# Patient Record
Sex: FEMALE
[De-identification: names, ages, dates, MRNs, and addresses within clinical notes are randomized per-mention and may not be internally consistent; named-entity substitution may affect disease eponyms.]

---

## 2021-01-12 PROBLEM — Z00.00 ENCOUNTER FOR PREVENTIVE HEALTH EXAMINATION: Status: ACTIVE | Noted: 2021-01-12

## 2021-01-14 ENCOUNTER — APPOINTMENT (OUTPATIENT)
Dept: NEUROSURGERY | Facility: CLINIC | Age: 21
End: 2021-01-14
Payer: COMMERCIAL

## 2021-01-14 ENCOUNTER — TRANSCRIPTION ENCOUNTER (OUTPATIENT)
Age: 21
End: 2021-01-14

## 2021-01-14 DIAGNOSIS — Z78.9 OTHER SPECIFIED HEALTH STATUS: ICD-10-CM

## 2021-01-14 PROCEDURE — 99202 OFFICE O/P NEW SF 15 MIN: CPT

## 2021-01-14 PROCEDURE — 99072 ADDL SUPL MATRL&STAF TM PHE: CPT

## 2021-01-20 NOTE — ASSESSMENT
[FreeTextEntry1] : Impression:\par Pulsatile tinnitus, likely venous\par \par MRA without AVM or DAVF\par \par We discussed possible causes of pulsatile tinnitus. These include:\par ENT causes such as semicircular canal dehiscence.\par Cardiac causes such as aortic stenosis, valve disease, heart murmur.\par Hypertension.\par Anemia.\par Thyroid disease.\par Cerebrovascular problems: arteriovenous malformation (AVM), dural arteriovenous fistula (DAVF), Venous sinus stenosis, venous aneurysm, large trans-mastoid emissary vein, carotid stenosis.\par Idiopathic Intracranial Hypertension\par \par \par PLAN\par MRV Head w/wo\par Follow-up after the MRV

## 2021-01-20 NOTE — REVIEW OF SYSTEMS
[As Noted in HPI] : as noted in HPI [Negative] : Heme/Lymph [de-identified] : Stress Related Headaches [FreeTextEntry4] : Pulsatile Tinnitus

## 2021-01-20 NOTE — PHYSICAL EXAM
[General Appearance - Alert] : alert [General Appearance - In No Acute Distress] : in no acute distress [Oriented To Time, Place, And Person] : oriented to person, place, and time [Impaired Insight] : insight and judgment were intact [Affect] : the affect was normal [Person] : oriented to person [Place] : oriented to place [Time] : oriented to time [Balance] : balance was intact [Sclera] : the sclera and conjunctiva were normal [Outer Ear] : the ears and nose were normal in appearance [Neck Appearance] : the appearance of the neck was normal [] : no respiratory distress [Respiration, Rhythm And Depth] : normal respiratory rhythm and effort [Heart Rate And Rhythm] : heart rate was normal and rhythm regular [Abnormal Walk] : normal gait [Skin Color & Pigmentation] : normal skin color and pigmentation [Past-pointing] : there was no past-pointing [Tremor] : no tremor present

## 2021-01-22 ENCOUNTER — TRANSCRIPTION ENCOUNTER (OUTPATIENT)
Age: 21
End: 2021-01-22

## 2021-02-08 ENCOUNTER — NON-APPOINTMENT (OUTPATIENT)
Age: 21
End: 2021-02-08

## 2021-02-08 ENCOUNTER — APPOINTMENT (OUTPATIENT)
Dept: NEUROSURGERY | Facility: CLINIC | Age: 21
End: 2021-02-08
Payer: COMMERCIAL

## 2021-02-08 VITALS — HEIGHT: 65 IN | WEIGHT: 130 LBS | BODY MASS INDEX: 21.66 KG/M2

## 2021-02-08 PROCEDURE — 99214 OFFICE O/P EST MOD 30 MIN: CPT | Mod: 95

## 2021-02-08 NOTE — HISTORY OF PRESENT ILLNESS
[FreeTextEntry1] : Ms. SHAH is a pleasant, right-handed 20 year old female who presents today follow up and MRV review for right ear pulsatile tinnitus. It first started in 4/2020. Since that time it has not been getting progressively worse or better. It is described as a constant, whooshing sound that is in sync with his pulse. Pressing on the right side of her neck and turning her head to the right side improves the sound. Stress, anxiety, exercise, bending down makes it worse. She has been seen by ENT, Dr. Melvin, in the past and had a normal hearing test as per patient. She gets headaches when she is stressed, not frequent. She also has some infrequent floaters in her eyes. She went to opthalmology who did not find any papilledema. She denies any blurry vision or diplopia.\par \par How much is pulsatile tinnitus affecting your quality of life (0-10, 10 worst)? 10\par

## 2021-02-08 NOTE — ASSESSMENT
[FreeTextEntry1] : RENE SHAH suffers from pulsatile tinnitus from venous origin. 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 We discussed the natural history of venous aneurysms and I explained to the patients that these are not associated with intracranial hemorrhage or stroke. \par \par The pulsatile tinnitus is severe and has a negative impact in social life, work and daily living. We discussed treatment options which include observation, trial of Diamox, endovascular treatment, open surgery.\par \par The patient would like to proceed with endovascular treatment which stent -assisted embolization of the venous diverticulum. We discussed with the patient my extensive personal experience with this treatment and the results of a recent clinical trial. We discussed the procedure that has two components. The first part consists of catheter angiogram and manometry to assess the degree of stenosis and confirm the presence of the venous aneurysm. This part is typically performed with the patient awake. The second part consists of embolization of the venous aneurysm utilizing stent, coils or both and is performed under general anesthesia.\par \par The risks, benefits and alternatives of the procedure were discussed with the patient in detail. In my personal experience, the risks are very rare, but the possibility is not zero. Risks include stroke, brain hemorrhage, any type of disability (facial or extremity weakness, facial or extremity numbness, speech difficulties, blindness) and death. There are also possible complications related to the incisions such as infection, pain, swelling and bleeding.\par \par The patient is aware that the procedure requires dual antiplatelet therapy for 1-month post-stent (typically aspirin 325 mg daily and clopidogrel 75 mg daily) and antiplatelet monotherapy (typically aspirin 325 mg daily) for additional 11 months post-stent.\par \par \par PLAN \par IStent-assisted embolization of venous aneurysm versus observation.\par The patient will consider her options.\par \par If stenting,\par \par PRE-SURGICAL WORK-UP\par Comprehensive Metabolic Panel\par CBC with Platelets and Differential\par PT, PTT, INR\par Covid-19 Testing \par \par ANESTHESIA REQUIRED\par Anesthesia: GETA\par \par Premedication with Aspirin 325 mg daily and Clopidogrel 75 mg daily, starting five days prior to the procedure.

## 2021-02-08 NOTE — DATA REVIEWED
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

## 2021-02-08 NOTE — PHYSICAL EXAM
[General Appearance - Alert] : alert [General Appearance - In No Acute Distress] : in no acute distress [] : normal voice and communication [Person] : oriented to person [Oriented To Time, Place, And Person] : oriented to person, place, and time [Place] : oriented to place [Time] : oriented to time

## 2021-02-08 NOTE — REASON FOR VISIT
[Home] : at home, [unfilled] , at the time of the visit. [Medical Office: ([facility])___] : at the medical office located in  [Verbal consent obtained from patient] : the patient, [unfilled] [Follow-Up: _____] : a [unfilled] follow-up visit

## 2021-03-09 ENCOUNTER — NON-APPOINTMENT (OUTPATIENT)
Age: 21
End: 2021-03-09

## 2021-03-29 ENCOUNTER — APPOINTMENT (OUTPATIENT)
Dept: NEUROSURGERY | Facility: CLINIC | Age: 21
End: 2021-03-29
Payer: COMMERCIAL

## 2021-03-29 DIAGNOSIS — H93.A9 PULSATILE TINNITUS, UNSPECIFIED EAR: ICD-10-CM

## 2021-03-29 PROCEDURE — ZZZZZ: CPT

## 2021-03-29 NOTE — REASON FOR VISIT
[Home] : at home, [unfilled] , at the time of the visit. [Medical Office: ([facility])___] : at the medical office located in  [Verbal consent obtained from patient] : the patient, [unfilled] [Follow-Up: _____] : a [unfilled] follow-up visit

## 2021-03-30 VITALS — WEIGHT: 130 LBS | HEIGHT: 65 IN | BODY MASS INDEX: 21.66 KG/M2

## 2021-04-09 NOTE — DATA REVIEWED
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

## 2021-04-09 NOTE — HISTORY OF PRESENT ILLNESS
[FreeTextEntry1] : Ms. SHAH is a pleasant, right-handed 20 year old female who presents today follow up for right ear pulsatile tinnitus. It first started in 4/2020. Since that time it has not been getting progressively worse or better. It is described as a constant, whooshing sound that is in sync with his pulse. Pressing on the right side of her neck and turning her head to the right side improves the sound. Stress, anxiety, exercise, bending down makes it worse. She has been seen by ENT, Dr. Melvin, in the past and had a normal hearing test as per patient. She gets headaches when she is stressed, not frequent. She also has some infrequent floaters in her eyes. She went to opthalmology who did not find any papilledema. She denies any blurry vision or diplopia.\par \par How much is pulsatile tinnitus affecting your quality of life (0-10, 10 worst)? 10\par \par Will see Cardiologist for POTS